# Patient Record
(demographics unavailable — no encounter records)

---

## 2024-10-23 NOTE — PLAN
[FreeTextEntry1] : [] Referral Dr Peres Hem/Onc [] Encourage hydration, sleep hygiene, decrease screen time, stress management, moderate exercise [] Ibuprofen 400mg or Naproxen 440mg for HAs, no more than 3 times per week [] Mg Oxide 400mg qhs,  Riboflavine 400mg qhs, CoQ10 100-200mg, melatonin 1-3mg qhs for HA ppx [] HA diary [] Repeat MRI brain [] Recommend counseling [] F/U in 3 months

## 2024-10-23 NOTE — HISTORY OF PRESENT ILLNESS
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 Last seen June 2024. rEEG and AEEG 3/2024 wnl so LEV and Vimpat weaned off    HPI Initially presented with headache, emesis and fever and seizures s/p LEV load in Nov 2023. 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  CSF 24 WBC, rest normal. Neg CSF cx and HSV PCR in CSF.  EEG with focal slowing Brain MRI wnl.   Soon after, developed change in behavior and persistent HAs. Fever resolved at that time VEEG showed multiple subclinical seizures s/p LEV and Vimpat.  Repeat MRI brain w/ and w/o contrast (11/30) showed T2/FLAIR abnormalities in L ant and inf temp lobe. 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  Pt received 5 day steroid course and 2 day IVIG course with minimal improvement.  Received PLEX therapy x 5, Rituximab 500mg/m2 x2 two weeks apart and another steroid course.   CSF came positive for NMDA Ab in CSF.   Hosp course also complicated with agitation treated with antipsychotics and catatonia s/p ativan f/u Psych.  Also developed dysautonomia.  PMHx as above FHx dad has migraines.   INTERVAL HX - New concerns of headaches, mainly forehead, photophobia, throbbing.  +/- nausea/ one emesis. No red flags.  Frequently associated with runny nose/congestion/ allergies.  - Doing great at school.  - No longer doing therapies (ds/c last week) - Off AS meds. No seizures.  - Taking Vit D - Pending appt with Dr Peres

## 2024-10-23 NOTE — ASSESSMENT
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  Off AS meds. Doing great at school.   Main issue today is frequent HAs suggestive of migraines and some anxiety.   Exam non focal including fundus  Discussed abortive and ppx measures for HAs including nutraceuticals ppx and therapy for anxiety

## 2024-10-23 NOTE — PHYSICAL EXAM
[Well-appearing] : well-appearing [Normocephalic] : normocephalic [No dysmorphic facial features] : no dysmorphic facial features [No ocular abnormalities] : no ocular abnormalities [Neck supple] : neck supple [No abnormal neurocutaneous stigmata or skin lesions] : no abnormal neurocutaneous stigmata or skin lesions [Straight] : straight [No axel or dimples] : no axel or dimples [No deformities] : no deformities [Alert] : alert [Well related, good eye contact] : well related, good eye contact [Conversant] : conversant [Normal speech and language] : normal speech and language [Follows instructions well] : follows instructions well [VFF] : VFF [Pupils reactive to light and accommodation] : pupils reactive to light and accommodation [Full extraocular movements] : full extraocular movements [Saccadic and smooth pursuits intact] : saccadic and smooth pursuits intact [No nystagmus] : no nystagmus [No papilledema] : no papilledema [Normal facial sensation to light touch] : normal facial sensation to light touch [No facial asymmetry or weakness] : no facial asymmetry or weakness [Gross hearing intact] : gross hearing intact [Equal palate elevation] : equal palate elevation [Good shoulder shrug] : good shoulder shrug [Normal tongue movement] : normal tongue movement [Midline tongue, no fasciculations] : midline tongue, no fasciculations [Normal axial and appendicular muscle tone] : normal axial and appendicular muscle tone [Gets up on table without difficulty] : gets up on table without difficulty [No pronator drift] : no pronator drift [Normal finger tapping and fine finger movements] : normal finger tapping and fine finger movements [No abnormal involuntary movements] : no abnormal involuntary movements [5/5 strength in proximal and distal muscles of arms and legs] : 5/5 strength in proximal and distal muscles of arms and legs [Walks and runs well] : walks and runs well [Able to do deep knee bend] : able to do deep knee bend [Able to walk on heels] : able to walk on heels [Able to walk on toes] : able to walk on toes [2+ biceps] : 2+ biceps [Triceps] : triceps [Knee jerks] : knee jerks [Ankle jerks] : ankle jerks [No ankle clonus] : no ankle clonus [Localizes LT and temperature] : localizes LT and temperature [No dysmetria on FTNT] : no dysmetria on FTNT [Good walking balance] : good walking balance [Normal gait] : normal gait [Able to tandem well] : able to tandem well [Negative Romberg] : negative Romberg [de-identified] : no distress

## 2024-11-13 NOTE — SOCIAL HISTORY
[Mother] : mother [Father] : father [Grade:  _____] : Grade: [unfilled] [FreeTextEntry1] : regular class, no school issues

## 2024-11-13 NOTE — REASON FOR VISIT
[New Patient Visit] : a new patient visit for [Mother] : mother [FreeTextEntry2] : anti-NMDA receptor encephalitis

## 2024-11-13 NOTE — HISTORY OF PRESENT ILLNESS
[No Feeding Issues] : no feeding issues at this time [de-identified] : Whitley is being seen today to establish cancer screening for her history of anti-NDMA receptor autoimmune encephalitis. Whitley was previously well when she presented in November 2023 with headaches, seizures, and changes in behavior. She had a complicated hospital course, with fevers, agitation, catatonia, and dysautonomia and was ultimately diagnosed with NMDA AE. She was initially treated with steroids and IVIG without significant improvement, and then with PLEX and rituximab, after which she did improve. She has continued to improve clinically, and is now back to baseline.  [de-identified] : Whitley reports she has been doing well recently. She is in 7th grade and is doing well in school, getting good grades, she likes math the best. Mom says she is completely back to her baseline from prior to her illness. No seizures and not on any AEDs. She has not yet had menarche. No belly pain, eats well. Says BMs are soft but not every day.

## 2024-11-13 NOTE — PHYSICAL EXAM
[Normal] : normal appearance, no rash, nodules, vesicles, ulcers, erythema [Neuro-onc exam] : PERRLA, EOMI, cranial nerves II-XII grossly intact, motor exam 5/5 throughout, sensory exam intact, normal patellar DTRs, no dysmetria, normal gait, no ataxia on tandem gait [100: Fully active, normal.] : 100: Fully active, normal.

## 2025-04-23 NOTE — CONSULT LETTER
[Dear  ___] : Dear  [unfilled], [Consult Letter:] : I had the pleasure of evaluating your patient, [unfilled]. [Please see my note below.] : Please see my note below. [Consult Closing:] : Thank you very much for allowing me to participate in the care of this patient.  If you have any questions, please do not hesitate to contact me. [Sincerely,] : Sincerely, [FreeTextEntry2] : Dr. Robi Newton MD (PCP) [address], [place], [place] [id number] [phone] [FreeTextEntry3] : Dilan Rivero MD Pediatric Otolaryngology [address] [address] Tel [phone] Fax [phone]

## 2025-04-23 NOTE — DATA REVIEWED
[FreeTextEntry1] : Notes reviewed - neurology - heme-onc  Imaging reviewed  history obtained from primary caregiver as independent historian due to patient's developmental age and limited recall

## 2025-04-23 NOTE — BIRTH HISTORY
[At Term] : at term [Normal Vaginal Route] : by normal vaginal route [None] : No maternal complications [Passed] : passed [de-identified] : Born in [facility]

## 2025-04-23 NOTE — BIRTH HISTORY
[At Term] : at term [Normal Vaginal Route] : by normal vaginal route [None] : No maternal complications [Passed] : passed [de-identified] : Born in [facility]

## 2025-04-23 NOTE — REVIEW OF SYSTEMS
[de-identified] : as per HPI  [de-identified] : as per HPI  [de-identified] : as per HPI  [FreeTextEntry2] : as per HPI

## 2025-04-23 NOTE — REVIEW OF SYSTEMS
[de-identified] : as per HPI  [de-identified] : as per HPI  [de-identified] : as per HPI  [FreeTextEntry2] : as per HPI

## 2025-04-23 NOTE — HISTORY OF PRESENT ILLNESS
[No Personal or Family History of Easy Bruising, Bleeding, or Issues with General Anesthesia] : No Personal or Family History of easy bruising, bleeding, or issues with general anesthesia [de-identified] : 13 year old girl presents for initial evaluation for recurrent throat infections.  PMHx: Nov-Dec 2023 with the diagnosis Anti-NMDA receptor encephalitis [place] [facility] [place] Reports two Strep infections in the past 6 months treated without difficulty. Patient denies throat/neck pain, globus sensation, dysphagia, odynophagia, dyspnea, dysphonia, hemoptysis or otalgia. No nasal congestion No snoring

## 2025-04-23 NOTE — REASON FOR VISIT
[Initial Consultation] : an initial consultation for [Mother] : mother [Language Line ] : provided by Language Line   [FreeTextEntry2] : recurrent throat infections [Interpreters_IDNumber] : [id number] [Interpreters_FullName] : Kelle [TWNoteComboBox1] : Kazakh

## 2025-04-23 NOTE — HISTORY OF PRESENT ILLNESS
[No Personal or Family History of Easy Bruising, Bleeding, or Issues with General Anesthesia] : No Personal or Family History of easy bruising, bleeding, or issues with general anesthesia [de-identified] : 13 year old girl presents for initial evaluation for recurrent throat infections.  PMHx: Nov-Dec 2023 with the diagnosis Anti-NMDA receptor encephalitis [place] [facility] [place] Reports two Strep infections in the past 6 months treated without difficulty. Patient denies throat/neck pain, globus sensation, dysphagia, odynophagia, dyspnea, dysphonia, hemoptysis or otalgia. No nasal congestion No snoring

## 2025-04-23 NOTE — CONSULT LETTER
[Dear  ___] : Dear  [unfilled], [Consult Letter:] : I had the pleasure of evaluating your patient, [unfilled]. [Please see my note below.] : Please see my note below. [Consult Closing:] : Thank you very much for allowing me to participate in the care of this patient.  If you have any questions, please do not hesitate to contact me. [Sincerely,] : Sincerely, [FreeTextEntry2] : Dr. Robi Newton MD (PCP) [address], [place], [place] [id number] [phone] [FreeTextEntry3] : Dilan Rivero MD Pediatric Otolaryngology [address] [address] Tel [phone] Fax [phone]

## 2025-04-23 NOTE — REASON FOR VISIT
[Initial Consultation] : an initial consultation for [Mother] : mother Resolved.   Continue to monitor  Lovenox daily [Language Line ] : provided by Language Line   [FreeTextEntry2] : recurrent throat infections [Interpreters_IDNumber] : [id number] [Interpreters_FullName] : Kelle [TWNoteComboBox1] : Ecuadorean

## 2025-06-04 NOTE — REASON FOR VISIT
[New Patient Visit] : a new patient visit for [Mother] : mother [Follow-Up Visit] : a follow-up visit for [FreeTextEntry2] : anti-NMDA receptor encephalitis

## 2025-06-04 NOTE — HISTORY OF PRESENT ILLNESS
[No Feeding Issues] : no feeding issues at this time [de-identified] : Whitley is being followed for cancer screening for her history of anti-NDMA receptor autoimmune encephalitis. Whitley was previously well when she presented in November 2023 with headaches, seizures, and changes in behavior. She had a complicated hospital course, with fevers, agitation, catatonia, and dysautonomia and was ultimately diagnosed with NMDA AE. She was initially treated with steroids and IVIG without significant improvement, and then with PLEX and rituximab, after which she did improve. She has continued to improve clinically, and is now back to baseline.  [de-identified] : Whitley is here today for followup and reports she has been doing well since her last visit. She was having headaches, diagnosed by PMD as migraines, happening with menses which she started in december. Says her periods are 5-7 days, heavy first 2 days but no soaking through clothes/changing pads <1 hour. No issues with memory, speech, gait. No school issues, did very well this year.   PET negative Nov 2024 chest CT and MRI negative Nov 2024

## 2025-06-04 NOTE — HISTORY OF PRESENT ILLNESS
[No Feeding Issues] : no feeding issues at this time [de-identified] : Whitley is being followed for cancer screening for her history of anti-NDMA receptor autoimmune encephalitis. Whitley was previously well when she presented in November 2023 with headaches, seizures, and changes in behavior. She had a complicated hospital course, with fevers, agitation, catatonia, and dysautonomia and was ultimately diagnosed with NMDA AE. She was initially treated with steroids and IVIG without significant improvement, and then with PLEX and rituximab, after which she did improve. She has continued to improve clinically, and is now back to baseline.  [de-identified] : Whitley is here today for followup and reports she has been doing well since her last visit. She was having headaches, diagnosed by PMD as migraines, happening with menses which she started in december. Says her periods are 5-7 days, heavy first 2 days but no soaking through clothes/changing pads <1 hour. No issues with memory, speech, gait. No school issues, did very well this year.   PET negative Nov 2024 chest CT and MRI negative Nov 2024